# Patient Record
Sex: MALE | Race: OTHER | HISPANIC OR LATINO | URBAN - METROPOLITAN AREA
[De-identification: names, ages, dates, MRNs, and addresses within clinical notes are randomized per-mention and may not be internally consistent; named-entity substitution may affect disease eponyms.]

---

## 2023-07-23 ENCOUNTER — EMERGENCY (EMERGENCY)
Facility: HOSPITAL | Age: 43
LOS: 1 days | Discharge: HOME | End: 2023-07-23
Attending: EMERGENCY MEDICINE
Payer: SELF-PAY

## 2023-07-23 VITALS
HEART RATE: 101 BPM | SYSTOLIC BLOOD PRESSURE: 132 MMHG | TEMPERATURE: 98 F | OXYGEN SATURATION: 96 % | HEIGHT: 64.96 IN | DIASTOLIC BLOOD PRESSURE: 90 MMHG | RESPIRATION RATE: 18 BRPM | WEIGHT: 211.2 LBS

## 2023-07-23 PROCEDURE — 99284 EMERGENCY DEPT VISIT MOD MDM: CPT

## 2023-07-23 PROCEDURE — 73564 X-RAY EXAM KNEE 4 OR MORE: CPT | Mod: 26,RT

## 2023-07-23 PROCEDURE — 99053 MED SERV 10PM-8AM 24 HR FAC: CPT

## 2023-07-23 PROCEDURE — 99283 EMERGENCY DEPT VISIT LOW MDM: CPT

## 2023-07-23 PROCEDURE — T1013: CPT

## 2023-07-23 PROCEDURE — 73564 X-RAY EXAM KNEE 4 OR MORE: CPT

## 2023-07-23 NOTE — ED PROVIDER NOTE - OBJECTIVE STATEMENT
42M with chronic right knee pain presenting to the ED c/o right medial knee pain s/p twisting injury while playing soccer last night. Pt states that his right knee cap usually dislocates and he can pop it back in. He also states that he fell down a few stairs a few days ago and may have slightly injured his knee then. Pt otherwise denies fever/chills, c/p, sob, abd pain, n/v/c/d, dysuria, numbness/tingling/weakness and has no other complaints at this time.

## 2023-07-23 NOTE — ED PROVIDER NOTE - CLINICAL SUMMARY MEDICAL DECISION MAKING FREE TEXT BOX
42M with chronic right knee pain presenting to the ED c/o right medial knee pain s/p twisting injury while playing soccer last night. 42M with chronic right knee pain presenting to the ED c/o right medial knee pain s/p twisting injury while playing soccer last night. Xrays wnl, pt placed in knee immobilizer/crutches and given ortho f/u.

## 2023-07-23 NOTE — ED PROVIDER NOTE - CARE PROVIDER_API CALL
Emmett Brown  Orthopaedic Surgery  [address] [place] [id number]  Phone: [phone]  Fax: [phone]  Follow Up Time:

## 2023-07-23 NOTE — ED PROVIDER NOTE - MUSCULOSKELETAL, MLM
+ FROM of the right knee with mild pain, TTP over the medial right knee with no joint laxity noted. 2+ DP pulse SILT. Comparments soft and compressible. No calf TTP.

## 2023-07-23 NOTE — ED ADULT NURSE NOTE - OBJECTIVE STATEMENT
pt presents c/o of right knee pain, difficulty walking, decrease in range of motion. Reports knee goes out of place, +cms, +2 dorsal pedis pulses

## 2023-07-23 NOTE — ED PROVIDER NOTE - NSFOLLOWUPINSTRUCTIONS_ED_ALL_ED_FT
Sprain    A sprain is a stretch or tear in one of the tough, fiber-like tissues (ligaments) in your body. This is caused by an injury to the area such as a twisting mechanism. Symptoms include pain, swelling, or bruising. Rest that area over the next several days and slowly resume activity when tolerated. Ice can help with swelling and pain.     SEEK IMMEDIATE MEDICAL CARE IF YOU HAVE ANY OF THE FOLLOWING SYMPTOMS: worsening pain, inability to move that body part, numbness or tingling. Graft Cartilage Fenestration Text: The cartilage was fenestrated with a 2mm punch biopsy to help facilitate graft survival and healing.

## 2023-07-23 NOTE — ED PROVIDER NOTE - ATTENDING APP SHARED VISIT CONTRIBUTION OF CARE
42-year-old male with chronic knee pain presents for knee pain after a plant and twist injury playing soccer last night.  No obviously deformity, full range of motion.  Knee x-ray without deformity.  Placed in knee immobilizer and Ortho follow-up.

## 2023-07-23 NOTE — ED ADULT TRIAGE NOTE - CHIEF COMPLAINT QUOTE
c/o of right knee pain, difficulty walking, decrease in range of motion. Reports knee goes out of place

## 2023-07-23 NOTE — ED PROVIDER NOTE - PATIENT PORTAL LINK FT
You can access the FollowMyHealth Patient Portal offered by [facility] by registering at the following website: [email or web address]. By joining [facility]’s FollowMyHealth portal, you will also be able to view your health information using other applications (apps) compatible with our system.